# Patient Record
Sex: FEMALE | URBAN - METROPOLITAN AREA
[De-identification: names, ages, dates, MRNs, and addresses within clinical notes are randomized per-mention and may not be internally consistent; named-entity substitution may affect disease eponyms.]

---

## 2022-04-11 ENCOUNTER — APPOINTMENT (RX ONLY)
Dept: URBAN - METROPOLITAN AREA CLINIC 176 | Facility: CLINIC | Age: 62
Setting detail: DERMATOLOGY
End: 2022-04-11

## 2022-04-11 DIAGNOSIS — Z41.9 ENCOUNTER FOR PROCEDURE FOR PURPOSES OTHER THAN REMEDYING HEALTH STATE, UNSPECIFIED: ICD-10-CM

## 2022-04-11 PROCEDURE — ? JUVEDERM VOLUMA XC INJECTION

## 2022-04-11 PROCEDURE — ? COSMETIC CONSULTATION: FILLERS

## 2022-04-11 PROCEDURE — ? COSMETIC CONSULTATION: BOTOX

## 2022-04-11 PROCEDURE — ? BOTOX

## 2022-04-11 NOTE — PROCEDURE: JUVEDERM VOLUMA XC INJECTION
Vermilion Lips Filler Volume In Cc: 0
Use Map Statement For Sites (Optional): No
Number Of Syringes (Required For Inventory): 2
Additional Anesthesia Volume In Cc: 6
Lot #: [id number]
Price (Use Numbers Only, No Special Characters Or $): 1500
Additional Area 1 Location: chin
Anesthesia Type: 1% lidocaine with epinephrine
Include Cannula Information In Note?: Yes
Map Statment: See Attach Map for Complete Details
Expiration Date (Month Year): 2022-12-16
Include Cannula Length?: 1.5 inch
Filler: Juvederm Voluma XC
Procedural Text: The filler was administered to the treatment areas noted above.
Consent: Written consent obtained. Risks include but not limited to bruising, beading, irregular texture, ulceration, infection, allergic reaction, scar formation, incomplete augmentation, temporary nature, procedural pain.
Anesthesia Volume In Cc: 0.5
Include Cannula Size?: 25G
Detail Level: Detailed
Post-Care Instructions: Patient instructed to apply ice to reduce swelling.

## 2022-04-11 NOTE — PROCEDURE: BOTOX
Show Levator Superior Units: Yes
Glabellar Complex Units: 10
L Brow Units: 0
Consent: Written consent obtained. Risks include but not limited to lid/brow ptosis, bruising, swelling, diplopia, temporary effect, incomplete chemical denervation.
Dilution (U/0.1 Cc): 5
Price (Use Numbers Only, No Special Characters Or $): 400
Additional Area 1 Location: Perioral
Show Mentalis Units: No
Depressor Anguli Oris Units: 7.5
Expiration Date (Month Year): 5/2024
Additional Area 3 Location: Mental
Additional Area 2 Location: [facility]
Post-Care Instructions: Patient instructed to not lie down for 4 hours and limit physical activity for 24 hours. Patient instructed not to travel by airplane for 48 hours.
Detail Level: Detailed
Periorbital Skin Units: 12.5
Lot #: [id number]

## 2022-07-12 ENCOUNTER — APPOINTMENT (RX ONLY)
Dept: URBAN - METROPOLITAN AREA CLINIC 176 | Facility: CLINIC | Age: 62
Setting detail: DERMATOLOGY
End: 2022-07-12

## 2022-07-12 DIAGNOSIS — Z41.9 ENCOUNTER FOR PROCEDURE FOR PURPOSES OTHER THAN REMEDYING HEALTH STATE, UNSPECIFIED: ICD-10-CM

## 2022-07-12 PROCEDURE — ? BOTOX

## 2022-07-12 NOTE — PROCEDURE: BOTOX
Show Levator Superior Units: Yes
Glabellar Complex Units: 10
L Brow Units: 0
Consent: Written consent obtained. Risks include but not limited to lid/brow ptosis, bruising, swelling, diplopia, temporary effect, incomplete chemical denervation.
Dilution (U/0.1 Cc): 5
Price (Use Numbers Only, No Special Characters Or $): 400
Additional Area 1 Location: Perioral
Show Mentalis Units: No
Depressor Anguli Oris Units: 7.5
Expiration Date (Month Year): 7/2024
Additional Area 3 Location: Mental
Additional Area 2 Location: [facility]
Post-Care Instructions: Patient instructed to not lie down for 4 hours and limit physical activity for 24 hours. Patient instructed not to travel by airplane for 48 hours.
Detail Level: Detailed
Periorbital Skin Units: 12.5
Lot #: [id number]

## 2022-08-25 ENCOUNTER — APPOINTMENT (RX ONLY)
Dept: URBAN - METROPOLITAN AREA CLINIC 176 | Facility: CLINIC | Age: 62
Setting detail: DERMATOLOGY
End: 2022-08-25

## 2022-08-25 DIAGNOSIS — Z41.9 ENCOUNTER FOR PROCEDURE FOR PURPOSES OTHER THAN REMEDYING HEALTH STATE, UNSPECIFIED: ICD-10-CM

## 2022-08-25 PROCEDURE — ? VIVACE

## 2022-08-25 ASSESSMENT — LOCATION ZONE DERM
LOCATION ZONE: NECK
LOCATION ZONE: FACE

## 2022-08-25 ASSESSMENT — LOCATION SIMPLE DESCRIPTION DERM
LOCATION SIMPLE: LEFT ANTERIOR NECK
LOCATION SIMPLE: INFERIOR FOREHEAD

## 2022-08-25 ASSESSMENT — LOCATION DETAILED DESCRIPTION DERM
LOCATION DETAILED: LEFT INFERIOR ANTERIOR NECK
LOCATION DETAILED: INFERIOR MID FOREHEAD

## 2022-08-25 NOTE — PROCEDURE: VIVACE
Depth In Mm: 1.4
Location #3: Cheeks
Location #1: Forehead/Nose/Orbital
Rf (Optional): 4/800
Consent: Written consent obtained, risks reviewed including but not limited to pain, scarring, infection and incomplete improvement.  Patient understands the procedure is cosmetic in nature and will require out of pocket payment.
Treatment Number (Optional): 1
Post-Care Instructions: After the procedure, take precautions agains sun exposure. Do not apply sunscreen for 12 hours after the procedure. Do not apply make-up for 12 hours after the procedure. Avoid alcohol based toners for 10-14 days. After 2-3 days patients can return to their regular skin regimen.
Detail Level: Zone
Rf (Optional): 5/500
Price (Use Numbers Only, No Special Characters Or $): 950.00
Depth In Mm: 1.8
Location #2: Lip/Chin
Depth In Mm: 0.1
Pre-Procedure Text: After consent was obtained the treatment areas were treated using the above parameters.
Location #4: Neck

## 2022-10-06 ENCOUNTER — APPOINTMENT (RX ONLY)
Dept: URBAN - METROPOLITAN AREA CLINIC 176 | Facility: CLINIC | Age: 62
Setting detail: DERMATOLOGY
End: 2022-10-06

## 2022-10-06 DIAGNOSIS — Z41.9 ENCOUNTER FOR PROCEDURE FOR PURPOSES OTHER THAN REMEDYING HEALTH STATE, UNSPECIFIED: ICD-10-CM

## 2022-10-06 PROCEDURE — ? VIVACE

## 2022-10-06 ASSESSMENT — LOCATION ZONE DERM
LOCATION ZONE: NECK
LOCATION ZONE: FACE

## 2022-10-06 ASSESSMENT — LOCATION DETAILED DESCRIPTION DERM
LOCATION DETAILED: RIGHT INFERIOR ANTERIOR NECK
LOCATION DETAILED: RIGHT INFERIOR MEDIAL FOREHEAD

## 2022-10-06 ASSESSMENT — LOCATION SIMPLE DESCRIPTION DERM
LOCATION SIMPLE: RIGHT ANTERIOR NECK
LOCATION SIMPLE: RIGHT FOREHEAD

## 2022-10-06 NOTE — PROCEDURE: VIVACE
Location #1: Forehead/Nose/Orbital
Rf (Optional): 4/800
Pre-Procedure Text: After consent was obtained the treatment areas were treated using the above parameters.
Consent: Written consent obtained, risks reviewed including but not limited to pain, scarring, infection and incomplete improvement.  Patient understands the procedure is cosmetic in nature and will require out of pocket payment.
Depth In Mm: 1.4
Location #3: Cheeks
Rf (Optional): 5/500
Detail Level: Zone
Location #2: Lip/Chin
Depth In Mm: 2
Post-Care Instructions: After the procedure, take precautions agains sun exposure. Do not apply sunscreen for 12 hours after the procedure. Do not apply make-up for 12 hours after the procedure. Avoid alcohol based toners for 10-14 days. After 2-3 days patients can return to their regular skin regimen.
Depth In Mm: 1
Depth In Mm: 0.1
Price (Use Numbers Only, No Special Characters Or $): 950.00
Location #4: Neck

## 2022-10-06 NOTE — HPI: COSMETIC (LASER RESURFACING)
Have You Had Laser Resurfacing Before?: has had previous treatments
When Was Your Last Laser Resurfacing Treatment?: 08/25/22

## 2022-11-01 ENCOUNTER — APPOINTMENT (RX ONLY)
Dept: URBAN - METROPOLITAN AREA CLINIC 176 | Facility: CLINIC | Age: 62
Setting detail: DERMATOLOGY
End: 2022-11-01

## 2022-11-01 DIAGNOSIS — Z41.9 ENCOUNTER FOR PROCEDURE FOR PURPOSES OTHER THAN REMEDYING HEALTH STATE, UNSPECIFIED: ICD-10-CM

## 2022-11-01 PROCEDURE — ? JEUVEAU

## 2022-11-01 NOTE — PROCEDURE: JEUVEAU
Price (Use Numbers Only, No Special Characters Or $): 554
Show Glabellar Units: Yes
[facility] Units: 0
Consent: Written consent obtained. Risks include but not limited to lid/brow ptosis, bruising, swelling, diplopia, temporary effect, incomplete chemical denervation.
Show Right And Left Pupillary Line Units: No
Additional Area 1 Location: orbicularis oris
Forehead Units: 10
Post-Care Instructions: Patient instructed to not lie down for 4 hours and limit physical activity for 24 hours.
Depressor Anguli Oris Units: 7.5
Additional Area 2 Location: Chin/mentalis
Lot #: [id number]
Detail Level: Detailed
Dilution (U/0.1 Cc): 5
Additional Area 3 Location: Gummy smile
Periorbital Skin Units: 12.5
Additional Area 4 Location: axillae
Expiration Date (Month Year): 03/2025

## 2022-11-21 ENCOUNTER — APPOINTMENT (RX ONLY)
Dept: URBAN - METROPOLITAN AREA CLINIC 176 | Facility: CLINIC | Age: 62
Setting detail: DERMATOLOGY
End: 2022-11-21

## 2022-11-21 DIAGNOSIS — Z41.9 ENCOUNTER FOR PROCEDURE FOR PURPOSES OTHER THAN REMEDYING HEALTH STATE, UNSPECIFIED: ICD-10-CM

## 2022-11-21 PROCEDURE — ? VIVACE

## 2022-11-21 ASSESSMENT — LOCATION DETAILED DESCRIPTION DERM
LOCATION DETAILED: LEFT MENTAL CREASE
LOCATION DETAILED: LEFT CENTRAL MALAR CHEEK
LOCATION DETAILED: RIGHT INFERIOR MEDIAL MALAR CHEEK
LOCATION DETAILED: LEFT INFERIOR MEDIAL FOREHEAD

## 2022-11-21 ASSESSMENT — LOCATION SIMPLE DESCRIPTION DERM
LOCATION SIMPLE: LEFT FOREHEAD
LOCATION SIMPLE: RIGHT CHEEK
LOCATION SIMPLE: CHIN
LOCATION SIMPLE: LEFT CHEEK

## 2022-11-21 ASSESSMENT — LOCATION ZONE DERM: LOCATION ZONE: FACE

## 2022-11-21 NOTE — HPI: COSMETIC (LASER RESURFACING)
Have You Had Laser Resurfacing Before?: has had previous treatments
When Was Your Last Laser Resurfacing Treatment?: 10/06/22

## 2022-11-21 NOTE — PROCEDURE: VIVACE
Treatment Number (Optional): 3
Consent: Written consent obtained, risks reviewed including but not limited to pain, scarring, infection and incomplete improvement.  Patient understands the procedure is cosmetic in nature and will require out of pocket payment.
Location #1: Forehead.Nose/Orbital
Location #2: Lip/Chin
Depth In Mm: 1.4
Pain Scale: 0
Post-Care Instructions: After the procedure, take precautions agains sun exposure. Do not apply sunscreen for 12 hours after the procedure. Do not apply make-up for 12 hours after the procedure. Avoid alcohol based toners for 10-14 days. After 2-3 days patients can return to their regular skin regimen.
Depth In Mm: 1
Location #3: Cheeks
Rf (Optional): 4/800
Price (Use Numbers Only, No Special Characters Or $): 650.00
Pre-Procedure Text: After consent was obtained the treatment areas were treated using the above parameters.
Depth In Mm: 1.8
Depth In Mm: 0.1
Rf (Optional): 5/500
Detail Level: Zone

## 2023-02-07 ENCOUNTER — APPOINTMENT (RX ONLY)
Dept: URBAN - METROPOLITAN AREA CLINIC 176 | Facility: CLINIC | Age: 63
Setting detail: DERMATOLOGY
End: 2023-02-07

## 2023-02-07 DIAGNOSIS — Z41.9 ENCOUNTER FOR PROCEDURE FOR PURPOSES OTHER THAN REMEDYING HEALTH STATE, UNSPECIFIED: ICD-10-CM

## 2023-02-07 PROCEDURE — ? RESTYLANE CONTOUR INJECTION

## 2023-02-07 PROCEDURE — ? JEUVEAU

## 2023-02-07 NOTE — PROCEDURE: RESTYLANE CONTOUR INJECTION
Vermilion Lips Filler Volume In Cc: 0
Use Map Statement For Sites (Optional): No
Number Of Syringes (Required For Inventory): 1
Additional Anesthesia Volume In Cc: 6
Anesthesia Type: 1% lidocaine with epinephrine
Topical Anesthesia?: BLT ointment (benzocaine 20%, lidocaine 10%, tetracaine 10%)
Procedural Text: The filler was administered to the treatment areas noted above.
Consent: Written consent obtained. Risks include but not limited to bruising, beading, irregular texture, ulceration, infection, allergic reaction, scar formation, incomplete augmentation, temporary nature, procedural pain.
Anesthesia Volume In Cc: 0.5
Lot #: [id number]
Map Statement: See Attach Map for Complete Details
Include Cannula Information In Note?: Yes
Expiration Date (Month Year): 2023-09-30
Detail Level: Detailed
Post-Care Instructions: Patient instructed to apply ice to reduce swelling.
Include Cannula Size?: 25G
Include Cannula Length?: 1.5 inch
Price (Use Numbers Only, No Special Characters Or $): 095
Filler: Restylane Contour
Price (Use Numbers Only, No Special Characters Or $): 435
Map Statement: See Attach Map for Complete Details
Lot #: [id number]
Lot #: [id number]

## 2023-02-07 NOTE — PROCEDURE: JEUVEAU
Price (Use Numbers Only, No Special Characters Or $): 346
Show Glabellar Units: Yes
[facility] Units: 0
Consent: Written consent obtained. Risks include but not limited to lid/brow ptosis, bruising, swelling, diplopia, temporary effect, incomplete chemical denervation.
Show Right And Left Pupillary Line Units: No
Additional Area 1 Location: orbicularis oris
Forehead Units: 10
Post-Care Instructions: Patient instructed to not lie down for 4 hours and limit physical activity for 24 hours.
Depressor Anguli Oris Units: 5
Additional Area 2 Location: Chin/mentalis
Lot #: [id number]
Detail Level: Detailed
Additional Area 3 Location: Gummy smile
Periorbital Skin Units: 15
Additional Area 4 Location: axillae
Expiration Date (Month Year): 03/2025

## 2023-05-08 ENCOUNTER — APPOINTMENT (RX ONLY)
Dept: URBAN - METROPOLITAN AREA CLINIC 176 | Facility: CLINIC | Age: 63
Setting detail: DERMATOLOGY
End: 2023-05-08

## 2023-05-08 DIAGNOSIS — Z41.9 ENCOUNTER FOR PROCEDURE FOR PURPOSES OTHER THAN REMEDYING HEALTH STATE, UNSPECIFIED: ICD-10-CM

## 2023-05-08 PROCEDURE — ? JEUVEAU

## 2023-05-08 NOTE — PROCEDURE: JEUVEAU
Price (Use Numbers Only, No Special Characters Or $): 929
Show Glabellar Units: Yes
[facility] Units: 0
Consent: Written consent obtained. Risks include but not limited to lid/brow ptosis, bruising, swelling, diplopia, temporary effect, incomplete chemical denervation.
Show Right And Left Pupillary Line Units: No
Additional Area 1 Location: orbicularis oris
Forehead Units: 10
Post-Care Instructions: Patient instructed to not lie down for 4 hours and limit physical activity for 24 hours.
Depressor Anguli Oris Units: 5
Additional Area 2 Location: Chin/mentalis
Lot #: [id number]
Detail Level: Detailed
Additional Area 3 Location: Gummy smile
Periorbital Skin Units: 15
Additional Area 4 Location: axillae
Expiration Date (Month Year): 5/2025

## 2023-05-24 ENCOUNTER — APPOINTMENT (RX ONLY)
Dept: URBAN - METROPOLITAN AREA CLINIC 176 | Facility: CLINIC | Age: 63
Setting detail: DERMATOLOGY
End: 2023-05-24

## 2023-05-24 DIAGNOSIS — Z41.9 ENCOUNTER FOR PROCEDURE FOR PURPOSES OTHER THAN REMEDYING HEALTH STATE, UNSPECIFIED: ICD-10-CM

## 2023-05-24 PROCEDURE — ? VIVACE

## 2023-05-24 ASSESSMENT — LOCATION DETAILED DESCRIPTION DERM
LOCATION DETAILED: LEFT CENTRAL MALAR CHEEK
LOCATION DETAILED: LEFT CHIN
LOCATION DETAILED: RIGHT CENTRAL MALAR CHEEK
LOCATION DETAILED: INFERIOR MID FOREHEAD

## 2023-05-24 ASSESSMENT — LOCATION SIMPLE DESCRIPTION DERM
LOCATION SIMPLE: LEFT CHEEK
LOCATION SIMPLE: CHIN
LOCATION SIMPLE: RIGHT CHEEK
LOCATION SIMPLE: INFERIOR FOREHEAD

## 2023-05-24 ASSESSMENT — LOCATION ZONE DERM: LOCATION ZONE: FACE

## 2023-05-24 NOTE — HPI: COSMETIC (LASER RESURFACING)
Have You Had Laser Resurfacing Before?: has had previous treatments
When Was Your Last Laser Resurfacing Treatment?: 11/21/22

## 2023-05-24 NOTE — PROCEDURE: VIVACE
Location #3: Cheeks
Location #1: Forehead/Nose/Orbital
Consent: Written consent obtained, risks reviewed including but not limited to pain, scarring, infection and incomplete improvement.  Patient understands the procedure is cosmetic in nature and will require out of pocket payment.
Detail Level: Zone
Depth In Mm: 0.1
Depth In Mm: 1.4
Location #2: Lip/Chin
Treatment Number (Optional): 4
Pain Scale: 0
Pre-Procedure Text: After consent was obtained the treatment areas were treated using the above parameters.
Depth In Mm: 1
Depth In Mm: 1.8
Post-Care Instructions: After the procedure, take precautions agains sun exposure. Do not apply sunscreen for 12 hours after the procedure. Do not apply make-up for 12 hours after the procedure. Avoid alcohol based toners for 10-14 days. After 2-3 days patients can return to their regular skin regimen.
Rf (Optional): 4/800
Price (Use Numbers Only, No Special Characters Or $): 670.00

## 2023-08-14 ENCOUNTER — APPOINTMENT (RX ONLY)
Dept: URBAN - METROPOLITAN AREA CLINIC 176 | Facility: CLINIC | Age: 63
Setting detail: DERMATOLOGY
End: 2023-08-14

## 2023-08-14 DIAGNOSIS — Z41.9 ENCOUNTER FOR PROCEDURE FOR PURPOSES OTHER THAN REMEDYING HEALTH STATE, UNSPECIFIED: ICD-10-CM

## 2023-08-14 PROCEDURE — ? ADDITIONAL NOTES

## 2023-08-14 PROCEDURE — ? JUVEDERM VOLUMA XC INJECTION

## 2023-08-14 PROCEDURE — ? JEUVEAU

## 2023-08-14 NOTE — PROCEDURE: JEUVEAU
Price (Use Numbers Only, No Special Characters Or $): 320
Show Glabellar Units: Yes
[facility] Units: 0
Consent: Written consent obtained. Risks include but not limited to lid/brow ptosis, bruising, swelling, diplopia, temporary effect, incomplete chemical denervation.
Show Right And Left Pupillary Line Units: No
Additional Area 1 Location: orbicularis oris
Forehead Units: 10
Post-Care Instructions: Patient instructed to not lie down for 4 hours and limit physical activity for 24 hours.
Depressor Anguli Oris Units: 5
Additional Area 2 Location: Chin/mentalis
Lot #: [id number]
Detail Level: Detailed
Additional Area 3 Location: Gummy smile
Periorbital Skin Units: 15
Additional Area 4 Location: axillae
Expiration Date (Month Year): 9/2025

## 2023-08-14 NOTE — PROCEDURE: JUVEDERM VOLUMA XC INJECTION
Temple Hollows Filler  Volume In Cc: 0
Lot #: [id number]
Detail Level: Detailed
Consent: Written consent obtained. Risks include but not limited to bruising, beading, irregular texture, ulceration, infection, allergic reaction, scar formation, incomplete augmentation, temporary nature, procedural pain.
Map Statment: See Attach Map for Complete Details
Procedural Text: The filler was administered to the treatment areas noted above.
Include Cannula Size?: 25G
Include Cannula Information In Note?: No
Filler: Juvederm Voluma XC
Post-Care Instructions: Patient instructed to apply ice to reduce swelling.
Price (Use Numbers Only, No Special Characters Or $): 984
Expiration Date (Month Year): 2024-01-25
Include Cannula Length?: 1.5 inch
Number Of Syringes (Required For Inventory): 1
Topical Anesthesia?: BLT ointment (benzocaine 20%, lidocaine 10%, tetracaine 10%)
Additional Area 1 Location: chin

## 2023-08-14 NOTE — PROCEDURE: ADDITIONAL NOTES
Additional Notes: Reviewed previous filler with patient. \\nSuggested to consider adding sculptra to the whole cheeks. Otherwise, could consider putting another syringe into the cheeks. \\nPatient seen Dr. Nguyen to consult on a facelift.
Render Risk Assessment In Note?: yes
Detail Level: Detailed

## 2023-09-11 ENCOUNTER — APPOINTMENT (RX ONLY)
Dept: URBAN - METROPOLITAN AREA CLINIC 176 | Facility: CLINIC | Age: 63
Setting detail: DERMATOLOGY
End: 2023-09-11

## 2023-09-11 DIAGNOSIS — Z41.9 ENCOUNTER FOR PROCEDURE FOR PURPOSES OTHER THAN REMEDYING HEALTH STATE, UNSPECIFIED: ICD-10-CM

## 2023-09-11 PROCEDURE — ? RESTYLANE LYFT INJECTION

## 2023-09-11 PROCEDURE — ? RESTYLANE DEFYNE INJECTION

## 2023-09-11 PROCEDURE — ? RESTYLANE KYSSE INJECTION

## 2023-09-11 NOTE — PROCEDURE: RESTYLANE LYFT INJECTION
Mid Face Filler  Volume In Cc: 0
Expiration Date (Month Year): 2023-02-28
Include Cannula Length?: 1.5 inch
Filler: Aric Victoria
Additional Area 1 Location: Submalar hollows
Anesthesia Type: 1% lidocaine with epinephrine
Consent: Written consent obtained. Risks include but not limited to bruising, beading, irregular texture, ulceration, infection, allergic reaction, scar formation, incomplete augmentation, temporary nature, procedural pain.
Use Map Statement For Sites (Optional): No
Number Of Syringes (Required For Inventory): 1
Additional Anesthesia Volume In Cc: 6
Lot #: [id number]
Include Cannula Size?: 27G
Procedural Text: The filler was administered to the treatment areas noted above.
Detail Level: Detailed
Anesthesia Volume In Cc: 0.5
Post-Care Instructions: Patient instructed to apply ice to reduce swelling.\\nPt aware of expiration date, no charge.
Map Statement: See Attach Map for Complete Details

## 2023-09-11 NOTE — PROCEDURE: RESTYLANE KYSSE INJECTION
Lateral Face Filler  Volume In Cc: 0
Anesthesia Type: 1% lidocaine with epinephrine
Use Map Statement For Sites (Optional): No
Number Of Syringes (Required For Inventory): 1
Lot #: [id number]
Post-Care Instructions: Patient instructed to apply ice to reduce swelling.
Consent: Written consent obtained. Risks include but not limited to bruising, beading, irregular texture, ulceration, infection, allergic reaction, scar formation, incomplete augmentation, temporary nature, procedural pain.
Procedural Text: The filler was administered to the treatment areas noted above.\\nPt aware of expiration date, no charge.
Additional Anesthesia Volume In Cc: 6
Nasolabial Folds Filler Volume In Cc: 0.5
Expiration Date (Month Year): 2023-06-30
Filler: Restylane Kysse
Detail Level: Detailed
Map Statement: See Attach Map for Complete Details

## 2023-09-11 NOTE — PROCEDURE: RESTYLANE DEFYNE INJECTION
Marionette Lines Filler  Volume In Cc: 1.5
Additional Area 1 Volume In Cc: 0
Lot #: [id number]
Anesthesia Volume In Cc: 0.5
Procedural Text: The filler was administered to the treatment areas noted above.
Consent: Written consent obtained. Risks include but not limited to bruising, beading, irregular texture, ulceration, infection, allergic reaction, scar formation, incomplete augmentation, temporary nature, procedural pain.
Map Statement: See Attach Map for Complete Details
Post-Care Instructions: Patient instructed to apply ice to reduce swelling.
Additional Area 2 Location: Chin
Include Cannula Size?: 25G
Detail Level: Detailed
Include Cannula Information In Note?: Yes
Expiration Date (Month Year): 2024-04-30
Include Cannula Length?: 1.5 inch
Filler: Restylane Defyne
Additional Anesthesia Volume In Cc: 6
Use Map Statement For Sites (Optional): No
Number Of Syringes (Required For Inventory): 2
Price (Use Numbers Only, No Special Characters Or $): 1200
Anesthesia Type: 1% lidocaine with epinephrine

## 2023-09-18 ENCOUNTER — APPOINTMENT (RX ONLY)
Dept: URBAN - METROPOLITAN AREA CLINIC 176 | Facility: CLINIC | Age: 63
Setting detail: DERMATOLOGY
End: 2023-09-18

## 2023-09-18 DIAGNOSIS — Z41.9 ENCOUNTER FOR PROCEDURE FOR PURPOSES OTHER THAN REMEDYING HEALTH STATE, UNSPECIFIED: ICD-10-CM

## 2023-09-18 PROCEDURE — ? ADDITIONAL NOTES

## 2023-09-18 PROCEDURE — ? RESTYLANE KYSSE INJECTION

## 2023-09-18 NOTE — PROCEDURE: RESTYLANE KYSSE INJECTION
Nasolabial Folds Filler Volume In Cc: 0
Marionette Lines Filler  Volume In Cc: 0.3
Include Cannula Information In Note?: No
Post-Care Instructions: Patient instructed to apply ice to reduce swelling.
Lot #: [id number]
Number Of Syringes (Required For Inventory): 1
Additional Anesthesia Volume In Cc: 6
Procedural Text: The filler was administered to the treatment areas noted above.
Consent: Written consent obtained. Risks include but not limited to bruising, beading, irregular texture, ulceration, infection, allergic reaction, scar formation, incomplete augmentation, temporary nature, procedural pain.
Anesthesia Type: 1% lidocaine with epinephrine
Filler: Restylane Kysse
Expiration Date (Month Year): 2023-06-30
Map Statement: See Attach Map for Complete Details
Detail Level: Detailed
Anesthesia Volume In Cc: 0.5

## 2023-12-21 ENCOUNTER — APPOINTMENT (RX ONLY)
Dept: URBAN - METROPOLITAN AREA CLINIC 176 | Facility: CLINIC | Age: 63
Setting detail: DERMATOLOGY
End: 2023-12-21

## 2023-12-21 DIAGNOSIS — Z41.9 ENCOUNTER FOR PROCEDURE FOR PURPOSES OTHER THAN REMEDYING HEALTH STATE, UNSPECIFIED: ICD-10-CM

## 2023-12-21 PROCEDURE — ? COSMETIC FOLLOW-UP

## 2023-12-21 PROCEDURE — ? VOLBELLA INJECTION

## 2023-12-21 PROCEDURE — ? JEUVEAU

## 2023-12-21 PROCEDURE — ? JUVEDERM VOLUMA XC INJECTION

## 2023-12-21 NOTE — PROCEDURE: JUVEDERM VOLUMA XC INJECTION
Topical Anesthesia?: BLT gel (benzocaine 20%, lidocaine 10%, tetracaine 10%)
Marionette Lines Filler  Volume In Cc: 0
Include Cannula Size?: 25G
Consent: Written consent obtained. Risks include but not limited to bruising, beading, irregular texture, ulceration, infection, allergic reaction, scar formation, incomplete augmentation, temporary nature, procedural pain.
Use Map Statement For Sites (Optional): No
Number Of Syringes (Required For Inventory): 1
Procedural Text: The filler was administered to the treatment areas noted above.
Lot #: [id number]
Detail Level: Detailed
Expiration Date (Month Year): 2024-03-22
Post-Care Instructions: Patient instructed to apply ice to reduce swelling.
Anesthesia Volume In Cc: 0.5
Include Cannula Length?: 1.5 inch
Map Statment: See Attach Map for Complete Details
Filler: Juvederm Voluma XC
Additional Area 1 Location: chin
Additional Anesthesia Volume In Cc: 6
Anesthesia Type: 1% lidocaine with epinephrine
Price (Use Numbers Only, No Special Characters Or $): 544
Lot #: [id number]
Price (Use Numbers Only, No Special Characters Or $): 094
Additional Area 2 Location: NEIL
Map Statment: See Attach Map for Complete Details

## 2023-12-21 NOTE — PROCEDURE: VOLBELLA INJECTION
Additional Area 2 Volume In Cc: 0
Expiration Date (Month Year): 2024-09-03
Map Statment: See Attach Map for Complete Details
Anesthesia Volume In Cc: 0.5
Include Cannula Length?: 1.5 inch
Detail Level: Detailed
Filler: Juvederm Volbella XC
Include Cannula Size?: 25G
Additional Anesthesia Volume In Cc: 6
Lot #: [id number]
Consent: Written consent obtained. Risks include but not limited to bruising, beading, irregular texture, ulceration, infection, allergic reaction, scar formation, incomplete augmentation, temporary nature, procedural pain.
Use Map Statement For Sites (Optional): No
Vermilion Lips Filler Volume In Cc: 1
Anesthesia Type: 1% lidocaine with epinephrine
Procedural Text: The filler was administered to the treatment areas noted above.
Post-Care Instructions: Patient instructed to apply ice to reduce swelling.
Include Cannula Information In Note?: Yes

## 2023-12-21 NOTE — PROCEDURE: JEUVEAU
Price (Use Numbers Only, No Special Characters Or $): 320
Show Glabellar Units: Yes
[facility] Units: 0
Consent: Written consent obtained. Risks include but not limited to lid/brow ptosis, bruising, swelling, diplopia, temporary effect, incomplete chemical denervation.
Show Right And Left Pupillary Line Units: No
Additional Area 1 Location: orbicularis oris
Forehead Units: 10
Post-Care Instructions: Patient instructed to not lie down for 4 hours and limit physical activity for 24 hours.
Glabellar Complex Units: 15
Depressor Anguli Oris Units: 5
Additional Area 2 Location: Chin/mentalis
Lot #: [id number]
Detail Level: Detailed
Additional Area 3 Location: Gummy smile
Additional Area 4 Location: axillae
Expiration Date (Month Year): 9/2025

## 2023-12-21 NOTE — PROCEDURE: COSMETIC FOLLOW-UP
Detail Level: Zone
Price (Use Numbers Only, No Special Characters Or $): 0
Comments (Free Text): Discussed with patient that she may have a small of lymphatic fluid under her eye. \\nSuggested to use a yimi roller which patient has been doing. Also suggested radio frequency [place] which patient is also patient is scheduled for next year.

## 2024-01-03 ENCOUNTER — APPOINTMENT (RX ONLY)
Dept: URBAN - METROPOLITAN AREA CLINIC 176 | Facility: CLINIC | Age: 64
Setting detail: DERMATOLOGY
End: 2024-01-03

## 2024-01-03 DIAGNOSIS — Z41.9 ENCOUNTER FOR PROCEDURE FOR PURPOSES OTHER THAN REMEDYING HEALTH STATE, UNSPECIFIED: ICD-10-CM

## 2024-01-03 PROCEDURE — ? VIVACE

## 2024-01-03 ASSESSMENT — LOCATION DETAILED DESCRIPTION DERM
LOCATION DETAILED: RIGHT INFERIOR CENTRAL MALAR CHEEK
LOCATION DETAILED: LEFT MEDIAL MALAR CHEEK
LOCATION DETAILED: LEFT CHIN
LOCATION DETAILED: INFERIOR MID FOREHEAD

## 2024-01-03 ASSESSMENT — LOCATION ZONE DERM: LOCATION ZONE: FACE

## 2024-01-03 ASSESSMENT — LOCATION SIMPLE DESCRIPTION DERM
LOCATION SIMPLE: CHIN
LOCATION SIMPLE: INFERIOR FOREHEAD
LOCATION SIMPLE: LEFT CHEEK
LOCATION SIMPLE: RIGHT CHEEK

## 2024-01-03 NOTE — PROCEDURE: VIVACE
Depth In Mm: 1.4
Pain Scale: 0
Location #1: Forehead/Nose/Orbital
Depth In Mm: 2
Price (Use Numbers Only, No Special Characters Or $): 775.00
Location #2: Lip/Chin
Location #3: Cheeks
Depth In Mm: 0.1
Pre-Procedure Text: After consent was obtained the treatment areas were treated using the above parameters.
Rf (Optional): 5/500
Detail Level: Zone
Post-Care Instructions: After the procedure, take precautions agains sun exposure. Do not apply sunscreen for 12 hours after the procedure. Do not apply make-up for 12 hours after the procedure. Avoid alcohol based toners for 10-14 days. After 2-3 days patients can return to their regular skin regimen.
Depth In Mm: 1
Consent: Written consent obtained, risks reviewed including but not limited to pain, scarring, infection and incomplete improvement.  Patient understands the procedure is cosmetic in nature and will require out of pocket payment.

## 2024-01-24 ENCOUNTER — APPOINTMENT (RX ONLY)
Dept: URBAN - METROPOLITAN AREA CLINIC 176 | Facility: CLINIC | Age: 64
Setting detail: DERMATOLOGY
End: 2024-01-24

## 2024-01-24 DIAGNOSIS — Z41.9 ENCOUNTER FOR PROCEDURE FOR PURPOSES OTHER THAN REMEDYING HEALTH STATE, UNSPECIFIED: ICD-10-CM

## 2024-01-24 DIAGNOSIS — L82.1 OTHER SEBORRHEIC KERATOSIS: ICD-10-CM

## 2024-01-24 PROCEDURE — ? JEUVEAU

## 2024-01-24 PROCEDURE — ? LIQUID NITROGEN (COSMETIC)

## 2024-01-24 PROCEDURE — ? ADDITIONAL NOTES

## 2024-01-24 ASSESSMENT — LOCATION ZONE DERM
LOCATION ZONE: SCALP
LOCATION ZONE: FACE
LOCATION ZONE: TRUNK
LOCATION ZONE: NECK

## 2024-01-24 ASSESSMENT — LOCATION SIMPLE DESCRIPTION DERM
LOCATION SIMPLE: CHEST
LOCATION SIMPLE: RIGHT ZYGOMA
LOCATION SIMPLE: LEFT ANTERIOR NECK
LOCATION SIMPLE: RIGHT CHEEK
LOCATION SIMPLE: LEFT CLAVICULAR SKIN
LOCATION SIMPLE: LEFT BREAST
LOCATION SIMPLE: LEFT SCALP
LOCATION SIMPLE: RIGHT FOREHEAD

## 2024-01-24 ASSESSMENT — LOCATION DETAILED DESCRIPTION DERM
LOCATION DETAILED: RIGHT LATERAL ZYGOMA
LOCATION DETAILED: LEFT INFERIOR LATERAL NECK
LOCATION DETAILED: RIGHT MEDIAL SUPERIOR CHEST
LOCATION DETAILED: LOWER STERNUM
LOCATION DETAILED: RIGHT INFERIOR LATERAL MALAR CHEEK
LOCATION DETAILED: LEFT MEDIAL SUPERIOR CHEST
LOCATION DETAILED: RIGHT LATERAL FOREHEAD
LOCATION DETAILED: LEFT CLAVICULAR SKIN
LOCATION DETAILED: RIGHT SUPERIOR LATERAL FOREHEAD
LOCATION DETAILED: LEFT LATERAL FRONTAL SCALP
LOCATION DETAILED: RIGHT LATERAL SUPERIOR CHEST
LOCATION DETAILED: LEFT SUPERIOR LATERAL NECK
LOCATION DETAILED: LEFT MEDIAL BREAST 10-11:00 REGION

## 2024-01-24 NOTE — PROCEDURE: LIQUID NITROGEN (COSMETIC)
Render Post-Care Instructions In Note?: no
Post-Care Instructions: I reviewed with the patient in detail post-care instructions. Patient is to wear sunprotection, and avoid picking at any of the treated lesions. Pt may apply Vaseline to crusted or scabbing areas.
Consent: The patient's consent was obtained including but not limited to risks of crusting, scabbing, blistering, scarring, darker or lighter pigmentary change, recurrence, incomplete removal and infection. The patient understands that the procedure is cosmetic in nature and is not covered by insurance.
Price (Use Numbers Only, No Special Characters Or $): 0
Detail Level: Detailed
Show Spray Paint Technique Variable?: Yes
Spray Paint Text: The liquid nitrogen was applied to the skin utilizing a spray paint frosting technique.
Billing Information: Bill by Static Price

## 2024-01-24 NOTE — PROCEDURE: JEUVEAU
Additional Area 2 Location: Chin/mentalis
Left Pupillary Line Units: 0
Show Lateral Platysmal Band Units: Yes
Expiration Date (Month Year): 11/2025
Additional Area 3 Location: Gummy smile
Show Ucl Units: No
Periorbital Skin Units: 15
Additional Area 2 Units: 10
Post-Care Instructions: Patient instructed to not lie down for 4 hours and limit physical activity for 24 hours.
Additional Area 5 Location: Platysma/Dodie
Dilution (U/0.1 Cc): 5
Consent: Written consent obtained. Risks include but not limited to lid/brow ptosis, bruising, swelling, diplopia, temporary effect, incomplete chemical denervation.
Additional Area 1 Location: orbicularis oris
Lot #: 
Detail Level: Detailed
Additional Area 4 Location: axillae
Price (Use Numbers Only, No Special Characters Or $): 025

## 2024-04-03 ENCOUNTER — APPOINTMENT (RX ONLY)
Dept: URBAN - METROPOLITAN AREA CLINIC 176 | Facility: CLINIC | Age: 64
Setting detail: DERMATOLOGY
End: 2024-04-03

## 2024-04-03 DIAGNOSIS — L82.1 OTHER SEBORRHEIC KERATOSIS: ICD-10-CM

## 2024-04-03 DIAGNOSIS — Z41.9 ENCOUNTER FOR PROCEDURE FOR PURPOSES OTHER THAN REMEDYING HEALTH STATE, UNSPECIFIED: ICD-10-CM

## 2024-04-03 PROCEDURE — ? JEUVEAU

## 2024-04-03 PROCEDURE — ? JUVEDERM ULTRA XC INJECTION

## 2024-04-03 PROCEDURE — ? LIQUID NITROGEN (COSMETIC)

## 2024-04-03 PROCEDURE — ? JUVEDERM VOLUMA XC INJECTION

## 2024-04-03 PROCEDURE — ? ADDITIONAL NOTES

## 2024-04-03 ASSESSMENT — LOCATION ZONE DERM: LOCATION ZONE: FACE

## 2024-04-03 ASSESSMENT — LOCATION SIMPLE DESCRIPTION DERM
LOCATION SIMPLE: RIGHT ZYGOMA
LOCATION SIMPLE: RIGHT FOREHEAD

## 2024-04-03 ASSESSMENT — LOCATION DETAILED DESCRIPTION DERM
LOCATION DETAILED: RIGHT LATERAL ZYGOMA
LOCATION DETAILED: RIGHT SUPERIOR LATERAL FOREHEAD
LOCATION DETAILED: RIGHT LATERAL FOREHEAD

## 2024-04-03 NOTE — PROCEDURE: JEUVEAU
Additional Area 2 Location: Chin/mentalis
Left Pupillary Line Units: 0
Show Lateral Platysmal Band Units: Yes
Expiration Date (Month Year): 5/2026
Additional Area 3 Location: Gummy smile
Show Ucl Units: No
Periorbital Skin Units: 15
Additional Area 2 Units: 10
Post-Care Instructions: Patient instructed to not lie down for 4 hours and limit physical activity for 24 hours.
Additional Area 5 Location: Platysma/Dodie
Dilution (U/0.1 Cc): 5
Consent: Written consent obtained. Risks include but not limited to lid/brow ptosis, bruising, swelling, diplopia, temporary effect, incomplete chemical denervation.
Additional Area 1 Location: orbicularis oris
Lot #: [id number]
Detail Level: Detailed
Additional Area 4 Location: axillae
Price (Use Numbers Only, No Special Characters Or $): 526

## 2024-04-03 NOTE — PROCEDURE: JUVEDERM VOLUMA XC INJECTION
Nasolabial Folds Filler Volume In Cc: 0
Anesthesia Type: 1% lidocaine with epinephrine
Price (Use Numbers Only, No Special Characters Or $): 580
Include Cannula Length?: 1.5 inch
Lot #: [id number], [id number]
Consent: Written consent obtained. Risks include but not limited to bruising, beading, irregular texture, ulceration, infection, allergic reaction, scar formation, incomplete augmentation, temporary nature, procedural pain.
Additional Area 1 Location: chin
Use Map Statement For Sites (Optional): No
Number Of Syringes (Required For Inventory): 2
Additional Anesthesia Volume In Cc: 6
Include Cannula Size?: 25G
Post-Care Instructions: Patient instructed to apply ice to reduce swelling.
Additional Area 1 Volume In Cc: 0.5
Procedural Text: The filler was administered to the treatment areas noted above.
Detail Level: Detailed
Cheeks Filler Volume In Cc: 1.5
Expiration Date (Month Year): 2024-09-20, 2024-07-21
Map Statment: See Attach Map for Complete Details
Filler: Juvederm Voluma XC

## 2024-04-03 NOTE — PROCEDURE: JUVEDERM ULTRA XC INJECTION
Vermilion Lips Filler Volume In Cc: 0.6
Use Map Statement For Sites (Optional): No
Additional Anesthesia Volume In Cc: 6
Lateral Face Filler Volume In Cc: 0
Number Of Syringes (Required For Inventory): 1
Detail Level: Detailed
Lot #: [id number]
Anesthesia Volume In Cc: 0.5
Procedural Text: The filler was administered to the treatment areas noted above.
Map Statment: See Attach Map for Complete Details
Consent: Written consent obtained. Risks include but not limited to bruising, beading, irregular texture, ulceration, infection, allergic reaction, scar formation, incomplete augmentation, temporary nature, procedural pain.
Expiration Date (Month Year): 2024-09-19
Post-Care Instructions: Patient instructed to apply ice to reduce swelling.
Price (Use Numbers Only, No Special Characters Or $): 435
Filler: Juvederm Ultra XC
Nasolabial Folds Filler Volume In Cc: 0.4

## 2024-08-06 ENCOUNTER — APPOINTMENT (RX ONLY)
Dept: URBAN - METROPOLITAN AREA CLINIC 176 | Facility: CLINIC | Age: 64
Setting detail: DERMATOLOGY
End: 2024-08-06

## 2024-08-06 DIAGNOSIS — Z41.9 ENCOUNTER FOR PROCEDURE FOR PURPOSES OTHER THAN REMEDYING HEALTH STATE, UNSPECIFIED: ICD-10-CM

## 2024-08-06 PROCEDURE — ? JUVEDERM VOLUMA XC INJECTION

## 2024-08-06 PROCEDURE — ? JUVEDERM ULTRA XC INJECTION

## 2024-08-06 PROCEDURE — ? JEUVEAU

## 2024-08-06 NOTE — PROCEDURE: JUVEDERM VOLUMA XC INJECTION
Additional Area 2 Volume In Cc: 0
Expiration Date (Month Year): 20225-01-20
Filler: Juvederm Voluma XC
Include Cannula Information In Note?: No
Post-Care Instructions: Patient instructed to apply ice to reduce swelling.
Include Cannula Length?: 1.5 inch
Anesthesia Type: 1% lidocaine with epinephrine
Additional Area 1 Location: chin
Price (Use Numbers Only, No Special Characters Or $): 551
Topical Anesthesia?: BLT ointment (benzocaine 20%, lidocaine 10%, tetracaine 10%)
Lot #: [id number]
Additional Anesthesia Volume In Cc: 6
Number Of Syringes (Required For Inventory): 1
Include Cannula Size?: 25G
Map Statment: See Attach Map for Complete Details
Consent: Written consent obtained. Risks include but not limited to bruising, beading, irregular texture, ulceration, infection, allergic reaction, scar formation, incomplete augmentation, temporary nature, procedural pain.
Detail Level: Detailed
Procedural Text: The filler was administered to the treatment areas noted above.
Anesthesia Volume In Cc: 0.5
Lot #: [id number]
Expiration Date (Month Year): 2024-12-02

## 2024-08-06 NOTE — PROCEDURE: JEUVEAU
Additional Area 2 Location: Chin/mentalis
Left Pupillary Line Units: 0
Show Lateral Platysmal Band Units: Yes
Expiration Date (Month Year): 8/2026
Additional Area 3 Location: Gummy smile
Show Ucl Units: No
Periorbital Skin Units: 15
Additional Area 2 Units: 10
Post-Care Instructions: Patient instructed to not lie down for 4 hours and limit physical activity for 24 hours.
Additional Area 5 Location: Platysma/Dodie
Dilution (U/0.1 Cc): 5
Consent: Written consent obtained. Risks include but not limited to lid/brow ptosis, bruising, swelling, diplopia, temporary effect, incomplete chemical denervation.
Additional Area 1 Location: orbicularis oris
Lot #: [id number]
Detail Level: Detailed
Additional Area 4 Location: axillae
Price (Use Numbers Only, No Special Characters Or $): 842

## 2024-08-06 NOTE — PROCEDURE: JUVEDERM ULTRA XC INJECTION
Use Map Statement For Sites (Optional): No
Mid Face Filler Volume In Cc: 0
Consent: Written consent obtained. Risks include but not limited to bruising, beading, irregular texture, ulceration, infection, allergic reaction, scar formation, incomplete augmentation, temporary nature, procedural pain.
Filler: Juvederm Ultra XC
Procedural Text: The filler was administered to the treatment areas noted above.
Additional Anesthesia Volume In Cc: 6
Post-Care Instructions: Patient instructed to apply ice to reduce swelling.
Expiration Date (Month Year): 2022-11-25
Map Statment: See Attach Map for Complete Details
Topical Anesthesia?: BLT ointment (benzocaine 20%, lidocaine 10%, tetracaine 10%)
Number Of Syringes (Required For Inventory): 1
Detail Level: Detailed
Price (Use Numbers Only, No Special Characters Or $): 498
Lot #: [id number]
Anesthesia Volume In Cc: 0.5

## 2024-09-09 ENCOUNTER — APPOINTMENT (RX ONLY)
Dept: URBAN - METROPOLITAN AREA CLINIC 176 | Facility: CLINIC | Age: 64
Setting detail: DERMATOLOGY
End: 2024-09-09

## 2024-09-09 DIAGNOSIS — Z41.9 ENCOUNTER FOR PROCEDURE FOR PURPOSES OTHER THAN REMEDYING HEALTH STATE, UNSPECIFIED: ICD-10-CM

## 2024-09-09 PROCEDURE — ? VIVACE

## 2024-09-09 ASSESSMENT — LOCATION DETAILED DESCRIPTION DERM
LOCATION DETAILED: LEFT INFERIOR MEDIAL MALAR CHEEK
LOCATION DETAILED: INFERIOR MID FOREHEAD
LOCATION DETAILED: LEFT CHIN
LOCATION DETAILED: RIGHT INFERIOR CENTRAL MALAR CHEEK

## 2024-09-09 ASSESSMENT — LOCATION SIMPLE DESCRIPTION DERM
LOCATION SIMPLE: INFERIOR FOREHEAD
LOCATION SIMPLE: CHIN
LOCATION SIMPLE: LEFT CHEEK
LOCATION SIMPLE: RIGHT CHEEK

## 2024-09-09 ASSESSMENT — LOCATION ZONE DERM: LOCATION ZONE: FACE

## 2024-09-09 NOTE — PROCEDURE: VIVACE
Consent: Written consent obtained, risks reviewed including but not limited to pain, scarring, infection and incomplete improvement.  Patient understands the procedure is cosmetic in nature and will require out of pocket payment.
Detail Level: Zone
Location #1: Forehead/Nose/Eyes
Location #3: Cheeks
Depth In Mm: 0.1
Treatment Number (Optional): 0
Depth In Mm: 1.4
Depth In Mm: 1
Depth In Mm: 1.8
Price (Use Numbers Only, No Special Characters Or $): 645.00
Pre-Procedure Text: After consent was obtained the treatment areas were treated using the above parameters.
Rf (Optional): 5/500
Post-Care Instructions: After the procedure, take precautions agains sun exposure. Do not apply sunscreen for 12 hours after the procedure. Do not apply make-up for 12 hours after the procedure. Avoid alcohol based toners for 10-14 days. After 2-3 days patients can return to their regular skin regimen.
Location #2: Lip/Chin

## 2024-12-05 ENCOUNTER — APPOINTMENT (RX ONLY)
Dept: URBAN - METROPOLITAN AREA CLINIC 176 | Facility: CLINIC | Age: 64
Setting detail: DERMATOLOGY
End: 2024-12-05

## 2024-12-05 DIAGNOSIS — Z41.9 ENCOUNTER FOR PROCEDURE FOR PURPOSES OTHER THAN REMEDYING HEALTH STATE, UNSPECIFIED: ICD-10-CM

## 2024-12-05 PROCEDURE — ? JEUVEAU

## 2024-12-05 PROCEDURE — ? JUVEDERM VOLUMA XC INJECTION

## 2024-12-05 PROCEDURE — ? JUVEDERM ULTRA XC INJECTION

## 2024-12-05 NOTE — PROCEDURE: JUVEDERM VOLUMA XC INJECTION
Additional Area 2 Volume In Cc: 0
Expiration Date (Month Year): 2025-02-24
Filler: Juvederm Voluma XC
Include Cannula Information In Note?: No
Post-Care Instructions: Patient instructed to apply ice to reduce swelling.
Include Cannula Length?: 1.5 inch
Additional Area 1 Location: chin
Price (Use Numbers Only, No Special Characters Or $): 611
Topical Anesthesia?: BLT ointment (benzocaine 20%, lidocaine 10%, tetracaine 10%)
Lot #: [id number]
Additional Anesthesia Volume In Cc: 6
Number Of Syringes (Required For Inventory): 1
Include Cannula Size?: 25G
Map Statment: See Attach Map for Complete Details
Consent: Written consent obtained. Risks include but not limited to bruising, beading, irregular texture, ulceration, infection, allergic reaction, scar formation, incomplete augmentation, temporary nature, procedural pain.
Detail Level: Detailed
Procedural Text: The filler was administered to the treatment areas noted above.
Lot #: [id number]
Expiration Date (Month Year): 2026-02-28

## 2024-12-05 NOTE — PROCEDURE: JUVEDERM ULTRA XC INJECTION
Use Map Statement For Sites (Optional): No
Mid Face Filler Volume In Cc: 0
Consent: Written consent obtained. Risks include but not limited to bruising, beading, irregular texture, ulceration, infection, allergic reaction, scar formation, incomplete augmentation, temporary nature, procedural pain.
Filler: Juvederm Ultra XC
Procedural Text: The filler was administered to the treatment areas noted above.
Additional Area 2 Location: Periform
Additional Anesthesia Volume In Cc: 6
Post-Care Instructions: Patient instructed to apply ice to reduce swelling.
Expiration Date (Month Year): 2025-12-22
Additional Area 2 Volume In Cc: 0.2
Map Statment: See Attach Map for Complete Details
Topical Anesthesia?: BLT ointment (benzocaine 20%, lidocaine 10%, tetracaine 10%)
Number Of Syringes (Required For Inventory): 1
Detail Level: Detailed
Additional Area 1 Location: Chin
Vermilion Lips Filler Volume In Cc: 0.6
Price (Use Numbers Only, No Special Characters Or $): 721
Lot #: [id number]

## 2025-01-14 NOTE — PROCEDURE: JEUVEAU
Patient needs Diagnostic sleep study order has been to long needs new CPAP supplies  
Additional Area 2 Location: Chin/mentalis
Left Pupillary Line Units: 0
Show Lateral Platysmal Band Units: Yes
Expiration Date (Month Year): 3/2027
Additional Area 3 Location: Gummy smile
Show Ucl Units: No
Periorbital Skin Units: 15
Additional Area 2 Units: 10
Post-Care Instructions: Patient instructed to not lie down for 4 hours and limit physical activity for 24 hours.
Additional Area 5 Location: Platysma/Dodie
Dilution (U/0.1 Cc): 5
Consent: Written consent obtained. Risks include but not limited to lid/brow ptosis, bruising, swelling, diplopia, temporary effect, incomplete chemical denervation.
Additional Area 1 Location: orbicularis oris
Lot #: [id number]
Detail Level: Detailed
Additional Area 4 Location: axillae
Price (Use Numbers Only, No Special Characters Or $): 739

## 2025-06-02 ENCOUNTER — APPOINTMENT (OUTPATIENT)
Dept: URBAN - METROPOLITAN AREA CLINIC 176 | Facility: CLINIC | Age: 65
Setting detail: DERMATOLOGY
End: 2025-06-02

## 2025-06-02 DIAGNOSIS — Z41.9 ENCOUNTER FOR PROCEDURE FOR PURPOSES OTHER THAN REMEDYING HEALTH STATE, UNSPECIFIED: ICD-10-CM

## 2025-06-02 PROCEDURE — ? JEUVEAU

## 2025-06-02 PROCEDURE — ? JUVEDERM VOLUMA XC INJECTION

## 2025-06-02 PROCEDURE — ? JUVEDERM ULTRA XC INJECTION

## 2025-06-02 NOTE — PROCEDURE: JUVEDERM VOLUMA XC INJECTION
Price (Use Numbers Only, No Special Characters Or $): 675
Include Cannula Length?: 1.5 inch
Lateral Face Filler Volume In Cc: 0
Anesthesia Type: 1% lidocaine with epinephrine
Additional Area 1 Location: chin
Additional Anesthesia Volume In Cc: 6
Use Map Statement For Sites (Optional): No
Number Of Syringes (Required For Inventory): 1
Topical Anesthesia?: BLT ointment (benzocaine 20%, lidocaine 10%, tetracaine 10%)
Consent: Written consent obtained. Risks include but not limited to bruising, beading, irregular texture, ulceration, infection, allergic reaction, scar formation, incomplete augmentation, temporary nature, procedural pain.
Lot #: [id number]
Cheeks Filler Volume In Cc: 2
Include Cannula Size?: 25G
Detail Level: Detailed
Anesthesia Volume In Cc: 0.5
Procedural Text: The filler was administered to the treatment areas noted above.
Map Statment: See Attach Map for Complete Details
Post-Care Instructions: Patient instructed to apply ice to reduce swelling.
Expiration Date (Month Year): 2026-04-26
Filler: Juvederm Voluma XC

## 2025-06-02 NOTE — PROCEDURE: JUVEDERM ULTRA XC INJECTION
Lateral Face Filler Volume In Cc: 0
Price (Use Numbers Only, No Special Characters Or $): 222
Use Map Statement For Sites (Optional): No
Additional Anesthesia Volume In Cc: 6
Vermilion Lips Filler Volume In Cc: 1
Lot #: [id number]
Consent: Written consent obtained. Risks include but not limited to bruising, beading, irregular texture, ulceration, infection, allergic reaction, scar formation, incomplete augmentation, temporary nature, procedural pain.
Topical Anesthesia?: BLT ointment (benzocaine 20%, lidocaine 10%, tetracaine 10%)
Anesthesia Volume In Cc: 0.5
Post-Care Instructions: Patient instructed to apply ice to reduce swelling.
Detail Level: Detailed
Procedural Text: The filler was administered to the treatment areas noted above.
Map Statment: See Attach Map for Complete Details
Expiration Date (Month Year): 2026-07-29
Filler: Juvederm Ultra XC

## 2025-06-02 NOTE — PROCEDURE: JEUVEAU
Additional Area 2 Location: Chin/mentalis
Left Pupillary Line Units: 0
Show Lateral Platysmal Band Units: Yes
Expiration Date (Month Year): 5/2027
Additional Area 3 Location: Gummy smile
Show Ucl Units: No
Periorbital Skin Units: 15
Post-Care Instructions: Patient instructed to not lie down for 4 hours and limit physical activity for 24 hours.
Additional Area 5 Location: Platysma/Dodie
Dilution (U/0.1 Cc): 5
Consent: Written consent obtained. Risks include but not limited to lid/brow ptosis, bruising, swelling, diplopia, temporary effect, incomplete chemical denervation.
Additional Area 1 Location: orbicularis oris
Lot #: [id number]
Glabellar Complex Units: 10
Detail Level: Detailed
Additional Area 4 Location: axillae
Price (Use Numbers Only, No Special Characters Or $): 090

## 2025-06-23 ENCOUNTER — APPOINTMENT (OUTPATIENT)
Dept: URBAN - METROPOLITAN AREA CLINIC 191 | Facility: CLINIC | Age: 65
Setting detail: DERMATOLOGY
End: 2025-06-23

## 2025-06-23 DIAGNOSIS — Z41.9 ENCOUNTER FOR PROCEDURE FOR PURPOSES OTHER THAN REMEDYING HEALTH STATE, UNSPECIFIED: ICD-10-CM

## 2025-06-23 PROCEDURE — ? COSMETIC SURGICAL QUOTE - FACE

## 2025-06-23 PROCEDURE — ? COSMETIC CONSULTATION: GENERAL

## 2025-06-23 NOTE — PROCEDURE: COSMETIC SURGICAL QUOTE - FACE
Include Recovery Fees In Quote?: no
Xeomin Price Per Unit: 15
Restylane Price Per Syringe: 500
Facility Fee Per 0.25 Days: 250
Sales Tax: 0
Facility Initial Fee: [phone]
Procedure Detailed Description: ____________
Deposit Paid To Date: 150 consult fee paid 6/18/25
Include Facility Fees In Quote?: yes
Surgeon: Dr. Samuel DeVictor
Facility Initial Fee: [phone]
Anesthesia Provider: Anesthesiologist
Detail Level: Simple

## 2025-06-23 NOTE — HPI: COSMETIC CONSULTATION
Additional History: Patient presents to Dr. DeVictor after a referral from JESSICA Mccray. She is concerned about the bags and wrinkles around her eyes and the marionette lines in the lower face. Patient reports a Hx of Botox [id number] and  the cheeks , lips, and chin. Her last Botox/filler appointment was on June 1st, 2025. Patient denies any previous facial surgeries, laser treatments, blood thinners, diabetes, and smoking.